# Patient Record
Sex: FEMALE | Race: BLACK OR AFRICAN AMERICAN | Employment: UNEMPLOYED | URBAN - METROPOLITAN AREA
[De-identification: names, ages, dates, MRNs, and addresses within clinical notes are randomized per-mention and may not be internally consistent; named-entity substitution may affect disease eponyms.]

---

## 2018-06-26 ENCOUNTER — HOSPITAL ENCOUNTER (OUTPATIENT)
Dept: LAB | Age: 58
Discharge: HOME | End: 2018-06-26

## 2018-06-26 ENCOUNTER — HOSPITAL ENCOUNTER (OUTPATIENT)
Dept: LAB | Age: 58
Discharge: HOME | End: 2018-06-26
Payer: MEDICARE

## 2018-06-26 ENCOUNTER — OFFICE VISIT (OUTPATIENT)
Dept: OBGYN CLINIC | Age: 58
End: 2018-06-26

## 2018-06-26 VITALS
HEIGHT: 61 IN | DIASTOLIC BLOOD PRESSURE: 74 MMHG | WEIGHT: 229 LBS | BODY MASS INDEX: 43.23 KG/M2 | HEART RATE: 92 BPM | SYSTOLIC BLOOD PRESSURE: 127 MMHG

## 2018-06-26 DIAGNOSIS — Z01.419 WELL WOMAN EXAM WITH ROUTINE GYNECOLOGICAL EXAM: Primary | ICD-10-CM

## 2018-06-26 DIAGNOSIS — Z11.3 SCREEN FOR STD (SEXUALLY TRANSMITTED DISEASE): ICD-10-CM

## 2018-06-26 PROBLEM — E66.01 OBESITY, MORBID (HCC): Status: ACTIVE | Noted: 2018-06-26

## 2018-06-26 PROCEDURE — 99001 SPECIMEN HANDLING PT-LAB: CPT | Performed by: OBSTETRICS & GYNECOLOGY

## 2018-06-26 PROCEDURE — 88175 CYTOPATH C/V AUTO FLUID REDO: CPT | Performed by: OBSTETRICS & GYNECOLOGY

## 2018-06-26 PROCEDURE — 87624 HPV HI-RISK TYP POOLED RSLT: CPT | Performed by: OBSTETRICS & GYNECOLOGY

## 2018-06-26 NOTE — PROGRESS NOTES
Subjective:   61 y.o. female for annual routine Pap and checkup. No LMP recorded. Patient is postmenopausal.  Last pap smear:  2017 NILM  Menstrual history: no PMB    H/o STIs:  no  Social History: not sexually active. [unfilled]  OB History    Para Term  AB Living   2 1 1   1   SAB TAB Ectopic Molar Multiple Live Births              # Outcome Date GA Lbr Filippo/2nd Weight Sex Delivery Anes PTL Lv   2  83    M  None     1 Term                   Pertinent past medical hstory: HTN, current smoker; no history of DVT, CAD, DM, liver disease, migraines    Patient Active Problem List   Diagnosis Code    History of colonic polyps Z86.010    Obesity, morbid ([facility] E66.01     Patient Active Problem List    Diagnosis Date Noted    Obesity, morbid ([facility] 2018    History of colonic polyps 2013     Current Outpatient Prescriptions   Medication Sig Dispense Refill    ACETAMINOPHEN/DP-HYDRAMINE (EXCEDRIN PM PO) Take 2 Tabs by mouth as needed.  ammonium lactate (LAC-HYDRIN) 12 % lotion Apply  to affected area as needed. rub in to affected area well      baclofen (LIORESAL) 10 mg tablet Take  by mouth three (3) times daily.  gabapentin (NEURONTIN) 600 mg tablet Take  by mouth three (3) times daily.  hydroxychloroquine (PLAQUENIL) 200 mg tablet Take 200 mg by mouth daily.  sulfaSALAzine (AZULFIDINE) 500 mg tablet Take 500 mg by mouth four (4) times daily.  simvastatin (ZOCOR) 10 mg tablet Take 10 mg by mouth nightly. Indications: HYPERCHOLESTEROLEMIA      metoprolol (LOPRESSOR) 100 mg tablet Take 100 mg by mouth two (2) times a day. Indications: HYPERTENSION      NIFEdipine CR (ADALAT CC) 90 mg CR tablet Take 90 mg by mouth daily.  cholecalciferol (VITAMIN D3) 1,000 unit tablet Take 1,000 Units by mouth daily. Indications: VITAMIN D DEFICIENCY      losartan (COZAAR) 100 mg tablet Take 100 mg by mouth daily.       folic acid (FOLVITE) 1 mg tablet Take  by mouth daily.  methotrexate (RHEUMATREX) 2.5 mg tablet Take 25 mg by mouth every Monday. Allergies   Allergen Reactions    Lidocaine (Pf) Itching    Ibuprofen Shortness of Breath     Past Medical History:   Diagnosis Date    Arthritis     Hypercholesteremia     Hypertension     Morbid obesity ([address]      Past Surgical History:   Procedure Laterality Date    COLONOSCOPY N/A 5/26/2016    COLONOSCOPY performed by Liz Osman MD at [facility]    HX COLONOSCOPY  2010    HX ORTHOPAEDIC  2010    left foot x 4     Family History   Problem Relation Age of Onset    Uterine Cancer Mother     Colon Cancer Maternal Grandmother     Uterine Cancer Paternal Grandmother     HIV/AIDS Father      Social History   Substance Use Topics    Smoking status: Light Tobacco Smoker    Smokeless tobacco: Never Used    Alcohol use Yes      Comment: holidays        ROS:  Feeling well. No dyspnea or chest pain on exertion. No abdominal pain, change in bowel habits, black or bloody stools. No urinary tract symptoms. GYN ROS: normal menses, no pelvic pain or discharge, no breast pain or new or enlarging lumps on self exam. No neurological complaints. Objective:     Visit Vitals    /74    Pulse 92    Ht 5' 1\" (1.549 m)    Wt 229 lb (103.9 kg)    BMI 43.27 kg/m2     The patient appears well, alert, oriented x 3, in no distress. ENT normal.  Neck supple. No adenopathy or thyromegaly. NIKHIL. Lungs are clear, good air entry, no wheezes, rhonchi or rales. S1 and S2 normal, no murmurs, regular rate and rhythm. Abdomen soft without tenderness, guarding, mass or organomegaly. Extremities show no edema, normal peripheral pulses. Neurological is normal, no focal findings.     BREAST EXAM: right breast normal without mass, skin or nipple changes or axillary nodes, left breast normal without mass, skin or nipple changes or axillary nodes    PELVIC EXAM: VULVA: normal appearing vulva with no masses, tenderness or lesions, VAGINA: normal appearing vagina with normal color and discharge, no lesions, CERVIX: normal appearing cervix without discharge or lesions, UTERUS: uterus is normal size, shape, consistency and nontender, ADNEXA: normal adnexa in size, nontender and no masses, PAP: Pap smear done today Pelvic exam limited due to body habitus      Assessment/Plan:   well woman  Mammogram--done 3/2018  pap smear  counseled on breast self exam, STD prevention, HIV risk factors and prevention, menopause, osteoporosis and adequate intake of calcium and vitamin D  return annually or prn    ICD-10-CM ICD-9-CM    1. Well woman exam with routine gynecological exam Z01.419 V72.31 PAP IG, APTIMA HPV AND RFX 16/18,45 ([id number])   2. Screen for STD (sexually transmitted disease) Z11.3 V74.5    . Discussed with patient that our office will call for abnormal lab results. Normal results can be reviewed through [employer]. If patient has not received a phone call from our office or there are no results found on [employer], the patient has been instructed to call our office to follow up on results. I have verbalized the plan of care with patient and the patient expressed understanding.    All questions were answered

## 2018-06-26 NOTE — MR AVS SNAPSHOT
[address] [facility] [address] [id number] 
[phone] Patient: Ciara Mendez MRN: [id number] DVA:[birth year] Visit Information Date & Time Provider Department Dept. Phone Encounter #  
 6/26/2018  9:00 2102 [facility], [address] [facility] [phone] [id number] Follow-up Instructions Return in about 1 year (around 6/26/2019). Follow-up and Disposition History Upcoming Health Maintenance Date Due Hepatitis C Screening 1960 PAP AKA CERVICAL CYTOLOGY 9/3/1980 BREAST CANCER SCRN MAMMOGRAM 9/3/2009 FOBT Q 1 YEAR AGE 50-75 9/3/2009 Influenza Age 5 to Adult 8/1/2018 Allergies as of 6/26/2018  Review Complete On: 6/26/2018 By: Ayush Evans DO Severity Noted Reaction Type Reactions Lidocaine (Pf) Medium 03/28/2013    Itching Ibuprofen  03/28/2013    Shortness of Breath Current Immunizations  Never Reviewed Name Date Zoster Vaccine, Live 2/28/2018 Not reviewed this visit You Were Diagnosed With   
  
 Codes Comments Well woman exam with routine gynecological exam    -  Primary ICD-10-CM: H33.325 ICD-9-CM: V72.31 Screen for STD (sexually transmitted disease)     ICD-10-CM: Z11.3 ICD-9-CM: V74.5 Vitals BP Pulse Height(growth percentile) Weight(growth percentile) BMI OB Status 127/74 92 5' 1\" (1.549 m) 229 lb (103.9 kg) 43.27 kg/m2 Postmenopausal  
 Smoking Status Light Tobacco Smoker BMI and BSA Data Body Mass Index Body Surface Area  
 43.27 kg/m 2 2.11 m 2 Preferred Pharmacy Pharmacy Name Phone [id number] [place], [address] [place] [address] [phone] Your Updated Medication List  
  
   
This list is accurate as of 6/26/18 10:13 AM.  Always use your most recent med list.  
  
  
  
  
 ammonium lactate 12 % lotion Commonly known as:  LAC-HYDRIN  
 Apply  to affected area as needed. rub in to affected area well  
  
 baclofen 10 mg tablet Commonly known as:  LIORESAL Take  by mouth three (3) times daily. EXCEDRIN PM PO Take 2 Tabs by mouth as needed. folic acid 1 mg tablet Commonly known as:  Google Take  by mouth daily. gabapentin 600 mg tablet Commonly known as:  NEURONTIN Take  by mouth three (3) times daily. hydroxychloroquine 200 mg tablet Commonly known as:  PLAQUENIL Take 200 mg by mouth daily. losartan 100 mg tablet Commonly known as:  COZAAR Take 100 mg by mouth daily. methotrexate 2.5 mg tablet Commonly known as:  Terris Karishma Take 25 mg by mouth every Monday. metoprolol tartrate 100 mg IR tablet Commonly known as:  LOPRESSOR Take 100 mg by mouth two (2) times a day. Indications: HYPERTENSION  
  
 NIFEdipine ER 90 mg ER tablet Commonly known as:  ADALAT CC Take 90 mg by mouth daily. simvastatin 10 mg tablet Commonly known as:  ZOCOR Take 10 mg by mouth nightly. Indications: HYPERCHOLESTEROLEMIA  
  
 sulfaSALAzine 500 mg tablet Commonly known as:  AZULFIDINE Take 500 mg by mouth four (4) times daily. VITAMIN D3 1,000 unit tablet Generic drug:  cholecalciferol Take 1,000 Units by mouth daily. Indications: VITAMIN D DEFICIENCY Follow-up Instructions Return in about 1 year (around 6/26/2019). To-Do List   
 06/26/2018 Pathology:  PAP IG, APTIMA HPV AND RFX 16/18,45 ([id number]) Patient Instructions Well Visit, Women 48 to 72: Care Instructions Your Care Instructions Physical exams can help you stay healthy. Your doctor has checked your overall health and may have suggested ways to take good care of yourself. He or she also may have recommended tests. At home, you can help prevent illness with healthy eating, regular exercise, and other steps. Follow-up care is a key part of your treatment and safety. Be sure to make and go to all appointments, and call your doctor if you are having problems. It's also a good idea to know your test results and keep a list of the medicines you take. How can you care for yourself at home? · Reach and stay at a healthy weight. This will lower your risk for many problems, such as obesity, diabetes, heart disease, and high blood pressure. · Get at least 30 minutes of exercise on most days of the week. Walking is a good choice. You also may want to do other activities, such as running, swimming, cycling, or playing tennis or team sports. · Do not smoke. Smoking can make health problems worse. If you need help quitting, talk to your doctor about stop-smoking programs and medicines. These can increase your chances of quitting for good. · Protect your skin from too much sun. When you're outdoors from 10 a.m. to 4 p.m., stay in the shade or cover up with clothing and a hat with a wide brim. Wear sunglasses that block UV rays. Even when it's cloudy, put broad-spectrum sunscreen (SPF 30 or higher) on any exposed skin. · See a dentist one or two times a year for checkups and to have your teeth cleaned. · Wear a seat belt in the car. · Limit alcohol to 1 drink a day. Too much alcohol can cause health problems. Follow your doctor's advice about when to have certain tests. These tests can spot problems early. · Cholesterol. Your doctor will tell you how often to have this done based on your age, family history, or other things that can increase your risk for heart attack and stroke. · Blood pressure. Have your blood pressure checked during a routine doctor visit. Your doctor will tell you how often to check your blood pressure based on your age, your blood pressure results, and other factors.  
· Mammogram. Ask your doctor how often you should have a mammogram, which is an X-ray of your breasts. A mammogram can spot breast cancer before it can be felt and when it is easiest to treat. · Pap test and pelvic exam. Ask your doctor how often you should have a Pap test. You may not need to have a Pap test as often as you used to. · Vision. Have your eyes checked every year or two or as often as your doctor suggests. Some experts recommend that you have yearly exams for glaucoma and other age-related eye problems starting at age 48. · Hearing. Tell your doctor if you notice any change in your hearing. You can have tests to find out how well you hear. · Diabetes. Ask your doctor whether you should have tests for diabetes. · Colon cancer. You should begin tests for colon cancer at age 48. You may have one of several tests. Your doctor will tell you how often to have tests based on your age and risk. Risks include whether you already had a precancerous polyp removed from your colon or whether your parents, sisters and brothers, or children have had colon cancer. · Thyroid disease. Talk to your doctor about whether to have your thyroid checked as part of a regular physical exam. Women have an increased chance of a thyroid problem. · Osteoporosis. You should begin tests for bone density at age 72. If you are younger than 72, ask your doctor whether you have factors that may increase your risk for this disease. You may want to have this test before age 72. · Heart attack and stroke risk. At least every 4 to 6 years, you should have your risk for heart attack and stroke assessed. Your doctor uses factors such as your age, blood pressure, cholesterol, and whether you smoke or have diabetes to show what your risk for a heart attack or stroke is over the next 10 years. When should you call for help? Watch closely for changes in your health, and be sure to contact your doctor if you have any problems or symptoms that concern you. Where can you learn more? Go to http://helena-elham.info/. Enter W289 in the search box to learn more about \"Well Visit, Women 50 to 72: Care Instructions. \" Current as of: May 12, 2017 Content Version: 11.4 © 9750-9659 Healthwise[employer]. Care instructions adapted under license by [employer] (which disclaims liability or warranty for this information). If you have questions about a medical condition or this instruction, always ask your healthcare professional. [address] any warranty or liability for your use of this information. Patient Instructions History Introducing [facility]! Mirnadu Ge introduces [employer] patient portal. Now you can access parts of your medical record, email your doctor's office, and request medication refills online. 1. In your internet browser, go to [email or web address] 2. Click on the First Time User? Click Here link in the Sign In box. You will see the New Member Sign Up page. 3. Enter your [employer] Access Code exactly as it appears below. You will not need to use this code after youve completed the sign-up process. If you do not sign up before the expiration date, you must request a new code. · [employer] Access Code: [email or web address] Expires: 9/24/2018  9:12 AM 
 
4. Enter the last four digits of your Social Security Number (xxxx) and Date of Birth (mm/dd/yyyy) as indicated and click Submit. You will be taken to the next sign-up page. 5. Create a Re[employer]ix ID. This will be your [employer] login ID and cannot be changed, so think of one that is secure and easy to remember. 6. Create a [employer] password. You can change your password at any time. 7. Enter your Password Reset Question and Answer. This can be used at a later time if you forget your password. 8. Enter your e-mail address. You will receive e-mail notification when new information is available in [address] 9. Click Sign Up. You can now view and download portions of your medical record. 10. Click the Download Summary menu link to download a portable copy of your medical information. If you have questions, please visit the Frequently Asked Questions section of the [employer] website. Remember, [employer] is NOT to be used for urgent needs. For medical emergencies, dial 911. Now available from your iPhone and Android! Please provide this summary of care documentation to your next provider. Your primary care clinician is listed as Aileen Smallwood. If you have any questions after today's visit, please call [phone].

## 2018-06-26 NOTE — PATIENT INSTRUCTIONS
Well Visit, Women 48 to 72: Care Instructions  Your Care Instructions    Physical exams can help you stay healthy. Your doctor has checked your overall health and may have suggested ways to take good care of yourself. He or she also may have recommended tests. At home, you can help prevent illness with healthy eating, regular exercise, and other steps. Follow-up care is a key part of your treatment and safety. Be sure to make and go to all appointments, and call your doctor if you are having problems. It's also a good idea to know your test results and keep a list of the medicines you take. How can you care for yourself at home? · Reach and stay at a healthy weight. This will lower your risk for many problems, such as obesity, diabetes, heart disease, and high blood pressure. · Get at least 30 minutes of exercise on most days of the week. Walking is a good choice. You also may want to do other activities, such as running, swimming, cycling, or playing tennis or team sports. · Do not smoke. Smoking can make health problems worse. If you need help quitting, talk to your doctor about stop-smoking programs and medicines. These can increase your chances of quitting for good. · Protect your skin from too much sun. When you're outdoors from 10 a.m. to 4 p.m., stay in the shade or cover up with clothing and a hat with a wide brim. Wear sunglasses that block UV rays. Even when it's cloudy, put broad-spectrum sunscreen (SPF 30 or higher) on any exposed skin. · See a dentist one or two times a year for checkups and to have your teeth cleaned. · Wear a seat belt in the car. · Limit alcohol to 1 drink a day. Too much alcohol can cause health problems. Follow your doctor's advice about when to have certain tests. These tests can spot problems early. · Cholesterol.  Your doctor will tell you how often to have this done based on your age, family history, or other things that can increase your risk for heart attack and stroke. · Blood pressure. Have your blood pressure checked during a routine doctor visit. Your doctor will tell you how often to check your blood pressure based on your age, your blood pressure results, and other factors. · Mammogram. Ask your doctor how often you should have a mammogram, which is an X-ray of your breasts. A mammogram can spot breast cancer before it can be felt and when it is easiest to treat. · Pap test and pelvic exam. Ask your doctor how often you should have a Pap test. You may not need to have a Pap test as often as you used to. · Vision. Have your eyes checked every year or two or as often as your doctor suggests. Some experts recommend that you have yearly exams for glaucoma and other age-related eye problems starting at age 48. · Hearing. Tell your doctor if you notice any change in your hearing. You can have tests to find out how well you hear. · Diabetes. Ask your doctor whether you should have tests for diabetes. · Colon cancer. You should begin tests for colon cancer at age 48. You may have one of several tests. Your doctor will tell you how often to have tests based on your age and risk. Risks include whether you already had a precancerous polyp removed from your colon or whether your parents, sisters and brothers, or children have had colon cancer. · Thyroid disease. Talk to your doctor about whether to have your thyroid checked as part of a regular physical exam. Women have an increased chance of a thyroid problem. · Osteoporosis. You should begin tests for bone density at age 72. If you are younger than 72, ask your doctor whether you have factors that may increase your risk for this disease. You may want to have this test before age 72. · Heart attack and stroke risk. At least every 4 to 6 years, you should have your risk for heart attack and stroke assessed.  Your doctor uses factors such as your age, blood pressure, cholesterol, and whether you smoke or have diabetes to show what your risk for a heart attack or stroke is over the next 10 years. When should you call for help? Watch closely for changes in your health, and be sure to contact your doctor if you have any problems or symptoms that concern you. Where can you learn more? Go to http://helena-elham.info/. Enter X340 in the search box to learn more about \"Well Visit, Women 50 to 72: Care Instructions. \"  Current as of: May 12, 2017  Content Version: 11.4  © 1479-4673 [employer]. Care instructions adapted under license by [employer] (which disclaims liability or warranty for this information). If you have questions about a medical condition or this instruction, always ask your healthcare professional. [address] any warranty or liability for your use of this information.

## 2018-06-29 LAB
HBV SURFACE AG SERPL QL IA: NEGATIVE
HCV AB S/CO SERPL IA: <0.1 S/CO RATIO (ref 0–0.9)
HIV 1+2 AB+HIV1 P24 AG SERPL QL IA: NON REACTIVE
T PALLIDUM AB SER QL IA: NEGATIVE

## 2018-06-29 NOTE — PROGRESS NOTES
Pap NILM. HPV neg  Repeat pap in 5 years or as clinically indicated. HERBIE Miguel to send letter to patient with above recommendation.

## 2019-07-22 ENCOUNTER — OFFICE VISIT (OUTPATIENT)
Dept: OBGYN CLINIC | Age: 59
End: 2019-07-22

## 2019-07-22 VITALS
WEIGHT: 220 LBS | HEART RATE: 76 BPM | BODY MASS INDEX: 41.54 KG/M2 | SYSTOLIC BLOOD PRESSURE: 133 MMHG | HEIGHT: 61 IN | DIASTOLIC BLOOD PRESSURE: 86 MMHG | TEMPERATURE: 97.6 F

## 2019-07-22 DIAGNOSIS — Z01.419 WELL WOMAN EXAM WITH ROUTINE GYNECOLOGICAL EXAM: Primary | ICD-10-CM

## 2019-07-22 NOTE — PROGRESS NOTES
Subjective:   60 y.o. female for Well Woman Check. She is without complaints. No LMP recorded. Patient is postmenopausal.    Social History: not sexually active. Pertinent past medical hstory: hypertension, current smoker. Patient Active Problem List   Diagnosis Code    History of colonic polyps Z86.010    Obesity, morbid ([facility] [address] E66.01     Patient Active Problem List    Diagnosis Date Noted    Obesity, morbid ([facility] [address] 06/26/2018    History of colonic polyps 03/28/2013     Current Outpatient Medications   Medication Sig Dispense Refill    ammonium lactate (LAC-HYDRIN) 12 % lotion Apply  to affected area as needed. rub in to affected area well      baclofen (LIORESAL) 10 mg tablet Take  by mouth three (3) times daily.  gabapentin (NEURONTIN) 600 mg tablet Take  by mouth three (3) times daily.  hydroxychloroquine (PLAQUENIL) 200 mg tablet Take 200 mg by mouth daily.  sulfaSALAzine (AZULFIDINE) 500 mg tablet Take 500 mg by mouth four (4) times daily.  simvastatin (ZOCOR) 10 mg tablet Take 10 mg by mouth nightly. Indications: HYPERCHOLESTEROLEMIA      metoprolol (LOPRESSOR) 100 mg tablet Take 100 mg by mouth two (2) times a day. Indications: HYPERTENSION      NIFEdipine CR (ADALAT CC) 90 mg CR tablet Take 90 mg by mouth daily.  cholecalciferol (VITAMIN D3) 1,000 unit tablet Take 1,000 Units by mouth daily. Indications: VITAMIN D DEFICIENCY      losartan (COZAAR) 100 mg tablet Take 100 mg by mouth daily.  folic acid (FOLVITE) 1 mg tablet Take  by mouth daily.  methotrexate (RHEUMATREX) 2.5 mg tablet Take 25 mg by mouth every Monday.  ACETAMINOPHEN/DP-HYDRAMINE (EXCEDRIN PM PO) Take 2 Tabs by mouth as needed.        Allergies   Allergen Reactions    Lidocaine (Pf) Itching    Ibuprofen Shortness of Breath     Past Medical History:   Diagnosis Date    Arthritis     Hypercholesteremia     Hypertension     Morbid obesity ([facility] [address]      Past Surgical History:   Procedure Laterality Date    COLONOSCOPY N/A 5/26/2016    COLONOSCOPY performed by Betito Spence MD at [facility]    HX COLONOSCOPY  2010    HX ORTHOPAEDIC  2010    left foot x 4     Family History   Problem Relation Age of Onset    Uterine Cancer Mother     Colon Cancer Maternal Grandmother     Uterine Cancer Paternal Grandmother     HIV/AIDS Father      Social History     Tobacco Use    Smoking status: Light Tobacco Smoker    Smokeless tobacco: Never Used   Substance Use Topics    Alcohol use: Yes     Comment: holidays        ROS:  Feeling well. No dyspnea or chest pain on exertion. No abdominal pain, change in bowel habits, black or bloody stools. No urinary tract symptoms. GYN ROS: no breast pain or new or enlarging lumps on self exam. No neurological complaints. Objective:     Visit Vitals  /86   Pulse 76   Temp 97.6 °F (36.4 °C) (Oral)   Ht 5' 1\" (1.549 m)   Wt 220 lb (99.8 kg)   BMI 41.57 kg/m²       Physical Exam:  GENERAL: Well developed, well nourished, in no distress   NEURO/PSYCHE: Grossly intact, normal mood and affect   HEENT: Normal cephalic, atraumatic, good dentition, neck supple.  No thyromegaly   BREASTS: breasts appear normal, no suspicious masses, no skin or nipple changes, no nipple discharge   CV: regular rate and rhythm   LUNGS: clear to auscultation bilaterally, no wheezes, rhonchi or rales, good air entry with normal effort   ABDOMEN: + BS, soft without tenderness, no guarding, rebound or masses   EXTREMITIES: no edema or erythema noted   SKIN: Warm, dry, no lesions   LYMPHATICS: No supraclavicular, axillary or inguinal nodes noted     PELVIC EXAM:   LABIA MAJORA: no masses, tenderness or lesions   LABIA MINORA: no masses, tenderness or lesions   CLITORIS: no masses, tenderness or lesions   URETHRA: normal appearing, no masses or tenderness   BLADDER: no fullness or tenderness   VAGINA: moderate vaginal atrophy noted  PERINEUM: no masses, tenderness or lesions   CERVIX: No CMT or lesions   UTERUS: small, mobile, nontender   ADNEXA: nontender and no masses or nodularity    Assessment/Plan:   well woman  return annually or prn  Pap smear up to date; Mammogram done in April 2019    ICD-10-CM ICD-9-CM    1. Well woman exam with routine gynecological exam Z01.419 V72.31 CANCELED: EBONIE MAMMO BI SCREENING INCL CAD     Encounter Diagnoses   Name Primary?  Well woman exam with routine gynecological exam Yes     No orders of the defined types were placed in this encounter. Follow-up and Dispositions    · Return in about 1 year (around 7/22/2020) for Annual Exam or prn. Christelle Armas

## 2019-07-25 ENCOUNTER — HOSPITAL ENCOUNTER (OUTPATIENT)
Dept: MAMMOGRAPHY | Age: 59
Discharge: HOME | End: 2019-07-25
Attending: SPECIALIST

## 2019-07-25 DIAGNOSIS — Z12.31 VISIT FOR SCREENING MAMMOGRAM: ICD-10-CM

## 2020-06-12 ENCOUNTER — HOSPITAL ENCOUNTER (OUTPATIENT)
Dept: MAMMOGRAPHY | Age: 60
Discharge: HOME | End: 2020-06-12
Attending: SPECIALIST
Payer: MEDICARE

## 2020-06-12 PROCEDURE — 77063 BREAST TOMOSYNTHESIS BI: CPT

## 2020-07-27 ENCOUNTER — HOSPITAL ENCOUNTER (OUTPATIENT)
Dept: LAB | Age: 60
Discharge: HOME | End: 2020-07-27
Payer: MEDICARE

## 2020-07-27 ENCOUNTER — OFFICE VISIT (OUTPATIENT)
Dept: OBGYN CLINIC | Age: 60
End: 2020-07-27

## 2020-07-27 VITALS
DIASTOLIC BLOOD PRESSURE: 71 MMHG | HEART RATE: 63 BPM | HEIGHT: 61 IN | TEMPERATURE: 97.2 F | BODY MASS INDEX: 38.14 KG/M2 | SYSTOLIC BLOOD PRESSURE: 125 MMHG | RESPIRATION RATE: 18 BRPM | WEIGHT: 202 LBS

## 2020-07-27 DIAGNOSIS — Z01.419 WELL WOMAN EXAM WITH ROUTINE GYNECOLOGICAL EXAM: Primary | ICD-10-CM

## 2020-07-27 PROCEDURE — 87661 TRICHOMONAS VAGINALIS AMPLIF: CPT

## 2020-07-27 PROCEDURE — 88175 CYTOPATH C/V AUTO FLUID REDO: CPT

## 2020-07-27 PROCEDURE — 87624 HPV HI-RISK TYP POOLED RSLT: CPT

## 2020-07-27 NOTE — PROGRESS NOTES
Chief Complaint   Patient presents with    Well Woman     Visit Vitals  /71   Pulse 63   Temp 97.2 °F (36.2 °C) (Oral)   Resp 18   Ht 5' 1\" (1.549 m)   Wt 202 lb (91.6 kg)   BMI 38.17 kg/m²     Results for orders placed or performed in visit on 06/26/18   T PALLIDUM AB   Result Value Ref Range    Treponema pallidum Ab Negative Negative   HEPATITIS C AB   Result Value Ref Range    Hep C Virus Ab <0.1 0.0 - 0.9 s/co ratio   HIV 1/2 AG/AB, 4TH GENERATION,W RFLX CONFIRM   Result Value Ref Range    HIV SCREEN 4TH GENERATION WRFX Non Reactive Non Reactive   HEP B SURFACE AG   Result Value Ref Range    Hep B surface Ag screen Negative Negative

## 2020-07-27 NOTE — PROGRESS NOTES
Subjective:   60 y.o. female for Well Woman Check. She is without complaints. No LMP recorded. Patient is postmenopausal.    Social History: not sexually active. Patient Active Problem List   Diagnosis Code    History of colonic polyps Z86.010    Obesity, morbid ([facility] [address] E66.01     Patient Active Problem List    Diagnosis Date Noted    Obesity, morbid ([facility] [address] 06/26/2018    History of colonic polyps 03/28/2013     Current Outpatient Medications   Medication Sig Dispense Refill    ACETAMINOPHEN/DP-HYDRAMINE (EXCEDRIN PM PO) Take 2 Tabs by mouth as needed.  gabapentin (NEURONTIN) 600 mg tablet Take  by mouth three (3) times daily.  simvastatin (ZOCOR) 10 mg tablet Take 10 mg by mouth nightly. Indications: HYPERCHOLESTEROLEMIA      NIFEdipine CR (ADALAT CC) 90 mg CR tablet Take 90 mg by mouth daily.  cholecalciferol (VITAMIN D3) 1,000 unit tablet Take 1,000 Units by mouth daily. Indications: VITAMIN D DEFICIENCY      losartan (COZAAR) 100 mg tablet Take 100 mg by mouth daily.  ammonium lactate (LAC-HYDRIN) 12 % lotion Apply  to affected area as needed. rub in to affected area well      baclofen (LIORESAL) 10 mg tablet Take  by mouth three (3) times daily.  hydroxychloroquine (PLAQUENIL) 200 mg tablet Take 200 mg by mouth daily.  sulfaSALAzine (AZULFIDINE) 500 mg tablet Take 500 mg by mouth four (4) times daily.  metoprolol (LOPRESSOR) 100 mg tablet Take 100 mg by mouth two (2) times a day. Indications: HYPERTENSION      folic acid (FOLVITE) 1 mg tablet Take  by mouth daily.  methotrexate (RHEUMATREX) 2.5 mg tablet Take 25 mg by mouth every Monday.        Allergies   Allergen Reactions    Lidocaine (Pf) Itching    Ibuprofen Shortness of Breath     Past Medical History:   Diagnosis Date    Arthritis     Hypercholesteremia     Hypertension     Morbid obesity ([facility] [address]      Past Surgical History:   Procedure Laterality Date    COLONOSCOPY N/A 5/26/2016    COLONOSCOPY performed by Laron Ni MD at [facility]    HX COLONOSCOPY  2010    HX ORTHOPAEDIC  2010    left foot x 4     Family History   Problem Relation Age of Onset    Uterine Cancer Mother     Colon Cancer Maternal Grandmother     Uterine Cancer Paternal Grandmother     HIV/AIDS Father      Social History     Tobacco Use    Smoking status: Light Tobacco Smoker    Smokeless tobacco: Never Used   Substance Use Topics    Alcohol use: Yes     Comment: holidays        ROS:  Feeling well. No dyspnea or chest pain on exertion. No abdominal pain, change in bowel habits, black or bloody stools. No urinary tract symptoms. GYN ROS: no breast pain or new or enlarging lumps on self exam. No neurological complaints. Objective:     Visit Vitals  /71   Pulse 63   Temp 97.2 °F (36.2 °C) (Oral)   Resp 18   Ht 5' 1\" (1.549 m)   Wt 202 lb (91.6 kg)   BMI 38.17 kg/m²       Physical Exam:  GENERAL: Well developed, well nourished, in no distress   NEURO/PSYCHE: Grossly intact, normal mood and affect   HEENT: Normal cephalic, atraumatic, good dentition, neck supple.  No thyromegaly   BREASTS: breasts appear normal, no suspicious masses, no skin or nipple changes, no nipple discharge   CV: regular rate and rhythm   LUNGS: clear to auscultation bilaterally, no wheezes, rhonchi or rales, good air entry with normal effort   ABDOMEN: + BS, soft without tenderness, no guarding, rebound or masses   EXTREMITIES: no edema or erythema noted   SKIN: Warm, dry, no lesions   LYMPHATICS: No supraclavicular, axillary or inguinal nodes noted     PELVIC EXAM:   LABIA MAJORA: no masses, tenderness or lesions   LABIA MINORA: no masses, tenderness or lesions   CLITORIS: no masses, tenderness or lesions   URETHRA: normal appearing, no masses or tenderness   BLADDER: no fullness or tenderness   VAGINA: moderate vaginal atrophy  PERINEUM: no masses, tenderness or lesions   CERVIX: No CMT or lesions   UTERUS: small, mobile, nontender ADNEXA: nontender and no masses or nodularity    Assessment/Plan:   well woman  pap smear  return annually or prn    ICD-10-CM ICD-9-CM    1. Well woman exam with routine gynecological exam  Z01.419 V72.31 PAP IG, CT-NG TV HPV 16&18,45 ([id number], [id number])     Encounter Diagnoses   Name Primary?  Well woman exam with routine gynecological exam Yes     Orders Placed This Encounter    PAP IG, CT-NG TV HPV 16&18,45 ([id number], [id number])       .

## 2020-07-29 LAB
C TRACH RRNA SPEC QL NAA+PROBE: NEGATIVE
N GONORRHOEA RRNA SPEC QL NAA+PROBE: NEGATIVE
SPECIMEN SOURCE: NORMAL
T VAGINALIS RRNA SPEC QL NAA+PROBE: NEGATIVE